# Patient Record
Sex: FEMALE
[De-identification: names, ages, dates, MRNs, and addresses within clinical notes are randomized per-mention and may not be internally consistent; named-entity substitution may affect disease eponyms.]

---

## 2018-06-06 ENCOUNTER — HOSPITAL ENCOUNTER (OUTPATIENT)
Dept: HOSPITAL 88 - CT | Age: 83
End: 2018-06-06
Attending: FAMILY MEDICINE
Payer: MEDICARE

## 2018-06-06 DIAGNOSIS — K42.9: Primary | ICD-10-CM

## 2018-06-06 LAB
BUN SERPL-MCNC: 21 MG/DL (ref 7–26)
BUN/CREAT SERPL: 27 (ref 6–25)
EGFRCR SERPLBLD CKD-EPI 2021: > 60 ML/MIN (ref 60–?)

## 2018-06-06 PROCEDURE — 36415 COLL VENOUS BLD VENIPUNCTURE: CPT

## 2018-06-06 PROCEDURE — 84520 ASSAY OF UREA NITROGEN: CPT

## 2018-06-06 PROCEDURE — 82565 ASSAY OF CREATININE: CPT

## 2018-06-06 PROCEDURE — 74160 CT ABDOMEN W/CONTRAST: CPT

## 2018-06-06 NOTE — DIAGNOSTIC IMAGING REPORT
PROCEDURE:CT ABDOMEN W

COMPARISON:None.

INDICATIONS:UMBILICAL HERNIA

TECHNIQUE:  Routine protocol Volumetric CT abdomen after 

administration of 100 mL Isovue-370 intravenous contrast and 900 mm 

water enteric multiplanar reformatted images. DLP: 470.48

 

FINDINGS:

Clear lung bases. No pleural effusions. Normal heart size. Mild right 

coronary calcification. Mild mitral annulus calcification.

 

Liver: Diffuse low-attenuation. Midclavicular craniocaudal span 13 cm.

Normal gallbladder.

Pancreas: Normal

Spleen: Normal size. Calcified granuloma.

 

Adrenal glands: Normal

Kidneys: Mild bilateral cortical thinning suggesting chronic medical 

renal disease. 4.4 cm cyst of the right inferior pole (averaging 11 

Hounsfield units).

 

Bowel: Image segments are normal caliber.

Peritoneum: Normal

 

Vasculature: Moderate diffuse arteriosclerosis. Moderate calcification 

at the origins of the renal arteries.

Lymph nodes: Normal

 

Skeleton: Intact. Multilevel degenerative disc disease predominant the 

thoracic spine.

Soft tissues: Diastases recti with associated 1.7 x 2.4 cm 

supraumbilical hernia containing mesenteric fat fat/omentum. Inferior 

to this, is a associated 5 x 3 cm hernia containing normal caliber 

loops of small bowel, mesenteric fat and omentum.

 

CONCLUSION:

1. Complex supraumbilical hernia. There are at least 2 focal 

supraumbilical hernias associated with diastases recti. These contain 

mesenteric fat and omentum, as well as non-obstructed loops of small 

bowel.

2. Hepatic steatosis 

 

Dictated by:  Andrzej Sherwood M.D. on 6/06/2018 at 12:10     

Electronically approved by:  Andrzej Sherwood M.D. on 6/06/2018 at 

12:10